# Patient Record
Sex: FEMALE | Race: BLACK OR AFRICAN AMERICAN | NOT HISPANIC OR LATINO | Employment: OTHER | URBAN - METROPOLITAN AREA
[De-identification: names, ages, dates, MRNs, and addresses within clinical notes are randomized per-mention and may not be internally consistent; named-entity substitution may affect disease eponyms.]

---

## 2018-10-31 PROBLEM — N95.9 MENOPAUSAL PROBLEM: Status: ACTIVE | Noted: 2018-10-31

## 2018-10-31 PROBLEM — M85.80 OSTEOPENIA: Status: ACTIVE | Noted: 2018-10-31

## 2018-10-31 PROBLEM — E78.49 OTHER HYPERLIPIDEMIA: Status: ACTIVE | Noted: 2018-10-31

## 2018-10-31 PROBLEM — R31.29 MICROSCOPIC HEMATURIA: Status: ACTIVE | Noted: 2018-10-31

## 2018-10-31 PROBLEM — E55.9 VITAMIN D DEFICIENCY DISEASE: Status: ACTIVE | Noted: 2018-10-31

## 2018-10-31 PROBLEM — J45.40 MODERATE PERSISTENT ASTHMA WITHOUT COMPLICATION: Status: ACTIVE | Noted: 2018-10-31

## 2018-10-31 PROBLEM — I10 ESSENTIAL HYPERTENSION, BENIGN: Status: ACTIVE | Noted: 2018-10-31

## 2020-02-13 PROBLEM — H61.21 IMPACTED CERUMEN OF RIGHT EAR: Status: ACTIVE | Noted: 2020-02-13

## 2020-03-28 PROBLEM — R73.9 HYPERGLYCEMIA: Status: ACTIVE | Noted: 2020-03-28

## 2020-05-31 PROBLEM — E74.39 GLUCOSE INTOLERANCE: Status: ACTIVE | Noted: 2020-05-31

## 2020-06-03 PROBLEM — E88.819 INSULIN RESISTANCE: Status: ACTIVE | Noted: 2020-06-03

## 2022-09-29 PROBLEM — E88.819 INSULIN RESISTANCE: Status: RESOLVED | Noted: 2020-06-03 | Resolved: 2022-09-29

## 2022-09-29 PROBLEM — R79.9 ABNORMAL BLOOD CHEMISTRY: Status: ACTIVE | Noted: 2022-09-29

## 2022-10-18 PROBLEM — G47.33 OSA (OBSTRUCTIVE SLEEP APNEA): Status: ACTIVE | Noted: 2022-10-18

## 2023-11-30 ENCOUNTER — OFFICE VISIT (OUTPATIENT)
Dept: PULMONOLOGY | Facility: CLINIC | Age: 68
End: 2023-11-30
Payer: MEDICARE

## 2023-11-30 ENCOUNTER — CLINICAL SUPPORT (OUTPATIENT)
Dept: PULMONOLOGY | Facility: CLINIC | Age: 68
End: 2023-11-30
Payer: MEDICARE

## 2023-11-30 VITALS — WEIGHT: 207 LBS | BODY MASS INDEX: 38.09 KG/M2 | HEIGHT: 62 IN

## 2023-11-30 VITALS
WEIGHT: 207 LBS | OXYGEN SATURATION: 99 % | HEIGHT: 62 IN | BODY MASS INDEX: 38.09 KG/M2 | HEART RATE: 92 BPM | RESPIRATION RATE: 17 BRPM

## 2023-11-30 DIAGNOSIS — R06.09 DYSPNEA ON EXERTION: ICD-10-CM

## 2023-11-30 DIAGNOSIS — R06.09 DYSPNEA ON EXERTION: Primary | ICD-10-CM

## 2023-11-30 LAB
BRPFT: NORMAL
DLCO ADJ PRE: 15.45 ML/(MIN*MMHG)
DLCO SINGLE BREATH LLN: 14.24
DLCO SINGLE BREATH PRE REF: 77.3 %
DLCO SINGLE BREATH REF: 19.97
DLCOC SBVA LLN: 2.83
DLCOC SBVA PRE REF: 142.6 %
DLCOC SBVA REF: 4.43
DLCOC SINGLE BREATH LLN: 14.24
DLCOC SINGLE BREATH PRE REF: 77.3 %
DLCOC SINGLE BREATH REF: 19.97
DLCOVA LLN: 2.83
DLCOVA PRE REF: 142.6 %
DLCOVA PRE: 6.32 ML/(MIN*MMHG*L)
DLCOVA REF: 4.43
DLVAADJ PRE: 6.32 ML/(MIN*MMHG*L)
ERV LLN: -16449.35
ERV PRE REF: 41 %
ERV REF: 0.65
FEF 25 75 CHG: 33 %
FEF 25 75 LLN: 0.63
FEF 25 75 POST REF: 180.2 %
FEF 25 75 PRE REF: 135.6 %
FEF 25 75 REF: 1.61
FET100 CHG: -12.1 %
FEV1 CHG: 2.6 %
FEV1 FVC CHG: 5.6 %
FEV1 FVC LLN: 66
FEV1 FVC POST REF: 108.4 %
FEV1 FVC PRE REF: 102.7 %
FEV1 FVC REF: 79
FEV1 LLN: 1.26
FEV1 POST REF: 93.1 %
FEV1 PRE REF: 90.7 %
FEV1 REF: 1.78
FRCPLETH LLN: 1.74
FRCPLETH PREREF: 84.8 %
FRCPLETH REF: 2.56
FVC CHG: -2.8 %
FVC LLN: 1.61
FVC POST REF: 85.5 %
FVC PRE REF: 88 %
FVC REF: 2.26
IVC PRE: 1.35 L
IVC SINGLE BREATH LLN: 1.61
IVC SINGLE BREATH PRE REF: 59.8 %
IVC SINGLE BREATH REF: 2.26
MVV LLN: 63
MVV PRE REF: 118.2 %
MVV REF: 74
PEF CHG: -13.8 %
PEF LLN: 2.75
PEF POST REF: 121.8 %
PEF PRE REF: 141.3 %
PEF REF: 4.6
POST FEF 25 75: 2.9 L/S
POST FET 100: 6.06 SEC
POST FEV1 FVC: 85.74 %
POST FEV1: 1.66 L
POST FVC: 1.94 L
POST PEF: 5.6 L/S
PRE DLCO: 15.45 ML/(MIN*MMHG)
PRE ERV: 0.27 L
PRE FEF 25 75: 2.19 L/S
PRE FET 100: 6.89 SEC
PRE FEV1 FVC: 81.22 %
PRE FEV1: 1.62 L
PRE FRC PL: 2.17 L
PRE FVC: 1.99 L
PRE MVV: 88 L/MIN
PRE PEF: 6.5 L/S
PRE RV: 1.75 L
PRE TLC: 3.74 L
RAW LLN: 3.06
RAW PRE REF: 111.6 %
RAW PRE: 3.41 CMH2O*S/L
RAW REF: 3.06
RV LLN: 1.34
RV PRE REF: 91.7 %
RV REF: 1.91
RVTLC LLN: 32
RVTLC PRE REF: 111.2 %
RVTLC PRE: 46.79 %
RVTLC REF: 42
TLC LLN: 3.52
TLC PRE REF: 83.1 %
TLC REF: 4.51
VA PRE: 2.45 L
VA SINGLE BREATH LLN: 4.36
VA SINGLE BREATH PRE REF: 56.2 %
VA SINGLE BREATH REF: 4.36
VC LLN: 1.61
VC PRE REF: 88 %
VC PRE: 1.99 L
VC REF: 2.26
VTGRAWPRE: 2.3 L

## 2023-11-30 PROCEDURE — 94618 PULMONARY STRESS TESTING: CPT | Mod: S$GLB,,, | Performed by: INTERNAL MEDICINE

## 2023-11-30 PROCEDURE — 94729 PR C02/MEMBANE DIFFUSE CAPACITY: ICD-10-PCS | Mod: S$GLB,,, | Performed by: INTERNAL MEDICINE

## 2023-11-30 PROCEDURE — 99999 PR PBB SHADOW E&M-EST. PATIENT-LVL III: ICD-10-PCS | Mod: PBBFAC,,, | Performed by: INTERNAL MEDICINE

## 2023-11-30 PROCEDURE — 3008F PR BODY MASS INDEX (BMI) DOCUMENTED: ICD-10-PCS | Mod: CPTII,S$GLB,, | Performed by: INTERNAL MEDICINE

## 2023-11-30 PROCEDURE — 3008F BODY MASS INDEX DOCD: CPT | Mod: CPTII,S$GLB,, | Performed by: INTERNAL MEDICINE

## 2023-11-30 PROCEDURE — 94726 PLETHYSMOGRAPHY LUNG VOLUMES: CPT | Mod: S$GLB,,, | Performed by: INTERNAL MEDICINE

## 2023-11-30 PROCEDURE — 99999 PR PBB SHADOW E&M-EST. PATIENT-LVL III: CPT | Mod: PBBFAC,,, | Performed by: INTERNAL MEDICINE

## 2023-11-30 PROCEDURE — 3288F FALL RISK ASSESSMENT DOCD: CPT | Mod: CPTII,S$GLB,, | Performed by: INTERNAL MEDICINE

## 2023-11-30 PROCEDURE — 3060F PR POS MICROALBUMINURIA RESULT DOCUMENTED/REVIEW: ICD-10-PCS | Mod: CPTII,S$GLB,, | Performed by: INTERNAL MEDICINE

## 2023-11-30 PROCEDURE — 1160F RVW MEDS BY RX/DR IN RCRD: CPT | Mod: CPTII,S$GLB,, | Performed by: INTERNAL MEDICINE

## 2023-11-30 PROCEDURE — 94618 PULMONARY STRESS TESTING: ICD-10-PCS | Mod: S$GLB,,, | Performed by: INTERNAL MEDICINE

## 2023-11-30 PROCEDURE — 3060F POS MICROALBUMINURIA REV: CPT | Mod: CPTII,S$GLB,, | Performed by: INTERNAL MEDICINE

## 2023-11-30 PROCEDURE — 99204 PR OFFICE/OUTPT VISIT, NEW, LEVL IV, 45-59 MIN: ICD-10-PCS | Mod: 25,S$GLB,, | Performed by: INTERNAL MEDICINE

## 2023-11-30 PROCEDURE — 99999 PR PBB SHADOW E&M-EST. PATIENT-LVL I: ICD-10-PCS | Mod: PBBFAC,,,

## 2023-11-30 PROCEDURE — 1101F PR PT FALLS ASSESS DOC 0-1 FALLS W/OUT INJ PAST YR: ICD-10-PCS | Mod: CPTII,S$GLB,, | Performed by: INTERNAL MEDICINE

## 2023-11-30 PROCEDURE — 3066F PR DOCUMENTATION OF TREATMENT FOR NEPHROPATHY: ICD-10-PCS | Mod: CPTII,S$GLB,, | Performed by: INTERNAL MEDICINE

## 2023-11-30 PROCEDURE — 1159F PR MEDICATION LIST DOCUMENTED IN MEDICAL RECORD: ICD-10-PCS | Mod: CPTII,S$GLB,, | Performed by: INTERNAL MEDICINE

## 2023-11-30 PROCEDURE — 1101F PT FALLS ASSESS-DOCD LE1/YR: CPT | Mod: CPTII,S$GLB,, | Performed by: INTERNAL MEDICINE

## 2023-11-30 PROCEDURE — 3066F NEPHROPATHY DOC TX: CPT | Mod: CPTII,S$GLB,, | Performed by: INTERNAL MEDICINE

## 2023-11-30 PROCEDURE — 1160F PR REVIEW ALL MEDS BY PRESCRIBER/CLIN PHARMACIST DOCUMENTED: ICD-10-PCS | Mod: CPTII,S$GLB,, | Performed by: INTERNAL MEDICINE

## 2023-11-30 PROCEDURE — 3288F PR FALLS RISK ASSESSMENT DOCUMENTED: ICD-10-PCS | Mod: CPTII,S$GLB,, | Performed by: INTERNAL MEDICINE

## 2023-11-30 PROCEDURE — 3044F PR MOST RECENT HEMOGLOBIN A1C LEVEL <7.0%: ICD-10-PCS | Mod: CPTII,S$GLB,, | Performed by: INTERNAL MEDICINE

## 2023-11-30 PROCEDURE — 99204 OFFICE O/P NEW MOD 45 MIN: CPT | Mod: 25,S$GLB,, | Performed by: INTERNAL MEDICINE

## 2023-11-30 PROCEDURE — 94729 DIFFUSING CAPACITY: CPT | Mod: S$GLB,,, | Performed by: INTERNAL MEDICINE

## 2023-11-30 PROCEDURE — 94726 PULM FUNCT TST PLETHYSMOGRAP: ICD-10-PCS | Mod: S$GLB,,, | Performed by: INTERNAL MEDICINE

## 2023-11-30 PROCEDURE — 94060 EVALUATION OF WHEEZING: CPT | Mod: 59,S$GLB,, | Performed by: INTERNAL MEDICINE

## 2023-11-30 PROCEDURE — 1159F MED LIST DOCD IN RCRD: CPT | Mod: CPTII,S$GLB,, | Performed by: INTERNAL MEDICINE

## 2023-11-30 PROCEDURE — 94060 PR EVAL OF BRONCHOSPASM: ICD-10-PCS | Mod: 59,S$GLB,, | Performed by: INTERNAL MEDICINE

## 2023-11-30 PROCEDURE — 3044F HG A1C LEVEL LT 7.0%: CPT | Mod: CPTII,S$GLB,, | Performed by: INTERNAL MEDICINE

## 2023-11-30 PROCEDURE — 99999 PR PBB SHADOW E&M-EST. PATIENT-LVL I: CPT | Mod: PBBFAC,,,

## 2023-11-30 RX ORDER — SPIRONOLACTONE 50 MG/1
50 TABLET, FILM COATED ORAL
COMMUNITY
Start: 2023-09-07

## 2023-11-30 NOTE — PROCEDURES
"[facility]  Six Minute Walk     SUMMARY     Ordering Provider: Micah Mondragon MD   Interpreting Provider: Micah Mondragon MD  Performing nurse/tech/RT: VT, RT  Diagnosis:  (Dyspnea on exertion)  Height: 5' 1.5" (156.2 cm)  Weight: 93.9 kg (207 lb 0.2 oz)  BMI (Calculated): 38.5   Patient Race:             Phase Oxygen Assessment Supplemental O2 Heart   Rate Blood Pressure Yarely Dyspnea Scale Rating   Resting 98 % Room Air 68 bpm (!) 138/92 1   Exercise        Minute        1 97 % Room Air 110 bpm     2 97 % Room Air 120 bpm     3 96 % Room Air 123 bpm     4 97 % Room Air 125 bpm     5 98 % Room Air 135 bpm     6  98 % Room Air 133 bpm (!) 175/103 4   Recovery        Minute        1 98 % Room Air 98 bpm     2 99 % Room Air 94 bpm     3 99 % Room Air 90 bpm     4 99 % Room Air 84 bpm (!) 182/104 2     Six Minute Walk Summary  6MWT Status: completed without stopping  Patient Reported: Dyspnea     Interpretation:  Did the patient stop or pause?: No                                         Total Time Walked (Calculated): 360 seconds  Final Partial Lap Distance (feet): 50 feet  Total Distance Meters (Calculated): 441.96 meters  Predicted Distance Meters (Calculated): 388.51 meters  Percentage of Predicted (Calculated): 113.76  Peak VO2 (Calculated): 17.24  Mets: 4.93  Has The Patient Had a Previous Six Minute Walk Test?: No       Previous 6MWT Results  Has The Patient Had a Previous Six Minute Walk Test?: No      "

## 2023-11-30 NOTE — PROGRESS NOTES
Subjective:      Patient ID: Andria Rivas is a 68 y.o. female.    Chief Complaint: Shortness of Breath    Shortness of Breath        Patient is a 68-year-old female with obesity, longstanding hypertension, prior TIA in 2021 and arthritis here for complaints of shortness of breath.  She does not know exactly how long this has been going on but it has been going on for some time and getting gradually worse.  She is been told at some point she has asthma and occasionally uses an inhaler with only minimal to marginal benefit.  Causes for dyspnea or not entirely predictable but she does know that sudden increase in workload/demand we will cause her to be breathless.  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  No associated wheezing or cough.  No associated chest pain either pleuritic or anginal in nature.  She admits that her blood pressure has been less than ideally controlled for many years.  She is working to lose weight and tries to get at least some regular exercise with walking and going to the [employer].  She denies any orthopnea, PND.  No nocturnal wheezing or cough.  At some point she was told she had borderline obstructive sleep apnea but this was from a sleep study many years ago when she was heavier than current weight.    Past Medical History:   Diagnosis Date    Routine general medical examination at a health care facility 07/11/2017     As per HPI    Past Surgical History:   Procedure Laterality Date    HYSTERECTOMY      KNEE ARTHROSCOPY       Social History     Tobacco Use    Smoking status: Never    Smokeless tobacco: Never   Substance Use Topics    Alcohol use: Yes     Comment: Social    Drug use: No     Family History   Problem Relation Age of Onset    Hypertension Father     Diabetes Sister     Hypertension Sister     Cancer Neg Hx     Heart disease Neg Hx        Review of Systems   Respiratory:  Positive for shortness of  "breath.     as per hPI otherwise negative    Objective:     Physical Exam   Constitutional: She is oriented to person, place, and time. She appears well-developed. No distress.   HENT:   Head: Normocephalic.   Cardiovascular: Normal rate and regular rhythm.   No murmur heard.  Pulmonary/Chest: Normal expansion, symmetric chest wall expansion, effort normal and breath sounds normal. She has no wheezes.   Musculoskeletal:         General: No edema.      Cervical back: Neck supple.   Neurological: She is alert and oriented to person, place, and time.   Psychiatric: She has a normal mood and affect.   Nursing note and vitals reviewed.          11/30/2023    10:00 AM 3/14/2023     2:24 PM 11/16/2022    11:01 AM 10/18/2022    11:26 AM 9/29/2022     8:14 AM 11/16/2020     3:15 PM 8/14/2020     8:29 AM   Pulmonary Function Tests   SpO2 99 % 96 %   98 %     Height 5' 1.5" (1.562 m) 5' 1.5" (1.562 m) 5' 1.5" (1.562 m) 5' 1.5" (1.562 m) 5' 1.5" (1.562 m) 5' 1.5" (1.562 m)    Weight 93.9 kg (207 lb 0.2 oz) 94.7 kg (208 lb 12.8 oz) 98.2 kg (216 lb 9.6 oz) 98.9 kg (218 lb) 99 kg (218 lb 3.2 oz) 94.3 kg (208 lb) 93.4 kg (206 lb)   BMI (Calculated) 38.5 38.8 40.3 40.5 40.6 38.7         Assessment:     1. Dyspnea on exertion         Orders Placed This Encounter   Procedures    Complete PFT w/ bronchodilator     Standing Status:   Future     Standing Expiration Date:   11/30/2024     Order Specific Question:   Release to patient     Answer:   Immediate    Stress test, pulmonary     Standing Status:   Future     Standing Expiration Date:   11/30/2024     Order Specific Question:   Reason for study     Answer:   Functional status     Order Specific Question:   Release to patient     Answer:   Immediate         Plan:     Symptoms most consistent with diastolic dysfunction and poor blood pressure control  Asthma/obstruction remains possible but unlikely  We will get PFTs and 6 minute walk test hopefully today if they can do them  I will " review these results with her once they are available and make follow-up plans based on them     Addendum: 12:37 PM  Pulmonary function tests done today is completely normal  6 minute walk test she walked 114% of predicted walk distance with no desaturations but did have an exaggerated/hypertensive response to exercise  The above confirms my suspicion of diastolic dysfunction as a cause for her exertional dyspnea  No evidence for asthma or obstructive lung disease  Suggested that she restart Coreg which she was previously on for this problem  Follow up with primary care and recommended she may benefit from Cardiology evaluation as well  Return to see me p.r.n.

## 2025-07-23 ENCOUNTER — TELEPHONE (OUTPATIENT)
Dept: PHYSICAL MEDICINE AND REHAB | Facility: CLINIC | Age: 70
End: 2025-07-23
Payer: MEDICARE